# Patient Record
Sex: FEMALE | Race: WHITE | NOT HISPANIC OR LATINO | Employment: FULL TIME | URBAN - METROPOLITAN AREA
[De-identification: names, ages, dates, MRNs, and addresses within clinical notes are randomized per-mention and may not be internally consistent; named-entity substitution may affect disease eponyms.]

---

## 2023-10-29 ENCOUNTER — APPOINTMENT (OUTPATIENT)
Dept: RADIOLOGY | Facility: HOSPITAL | Age: 58
End: 2023-10-29
Payer: COMMERCIAL

## 2023-10-29 ENCOUNTER — HOSPITAL ENCOUNTER (EMERGENCY)
Facility: HOSPITAL | Age: 58
Discharge: HOME | End: 2023-10-29
Attending: EMERGENCY MEDICINE
Payer: COMMERCIAL

## 2023-10-29 VITALS
OXYGEN SATURATION: 100 % | WEIGHT: 140.87 LBS | RESPIRATION RATE: 16 BRPM | BODY MASS INDEX: 24.05 KG/M2 | HEART RATE: 77 BPM | HEIGHT: 64 IN | SYSTOLIC BLOOD PRESSURE: 146 MMHG | DIASTOLIC BLOOD PRESSURE: 67 MMHG | TEMPERATURE: 97.7 F

## 2023-10-29 DIAGNOSIS — S62.525A CLOSED NONDISPLACED FRACTURE OF DISTAL PHALANX OF LEFT THUMB, INITIAL ENCOUNTER: Primary | ICD-10-CM

## 2023-10-29 PROCEDURE — 99283 EMERGENCY DEPT VISIT LOW MDM: CPT | Mod: 25 | Performed by: EMERGENCY MEDICINE

## 2023-10-29 PROCEDURE — 94760 N-INVAS EAR/PLS OXIMETRY 1: CPT

## 2023-10-29 PROCEDURE — 2500000001 HC RX 250 WO HCPCS SELF ADMINISTERED DRUGS (ALT 637 FOR MEDICARE OP): Performed by: EMERGENCY MEDICINE

## 2023-10-29 PROCEDURE — 73130 X-RAY EXAM OF HAND: CPT | Mod: LT

## 2023-10-29 PROCEDURE — 29125 APPL SHORT ARM SPLINT STATIC: CPT | Mod: LT

## 2023-10-29 RX ORDER — IBUPROFEN 600 MG/1
600 TABLET ORAL ONCE
Status: COMPLETED | OUTPATIENT
Start: 2023-10-29 | End: 2023-10-29

## 2023-10-29 RX ADMIN — IBUPROFEN 600 MG: 600 TABLET, FILM COATED ORAL at 14:08

## 2023-10-29 ASSESSMENT — PAIN SCALES - GENERAL
PAINLEVEL_OUTOF10: 3
PAINLEVEL_OUTOF10: 1
PAINLEVEL_OUTOF10: 4

## 2023-10-29 ASSESSMENT — PAIN DESCRIPTION - FREQUENCY: FREQUENCY: CONSTANT/CONTINUOUS

## 2023-10-29 ASSESSMENT — PAIN - FUNCTIONAL ASSESSMENT
PAIN_FUNCTIONAL_ASSESSMENT: 0-10
PAIN_FUNCTIONAL_ASSESSMENT: 0-10

## 2023-10-29 ASSESSMENT — PAIN DESCRIPTION - ORIENTATION: ORIENTATION: LEFT

## 2023-10-29 ASSESSMENT — PAIN DESCRIPTION - LOCATION: LOCATION: FINGER (COMMENT WHICH ONE)

## 2023-10-29 ASSESSMENT — PAIN DESCRIPTION - DESCRIPTORS: DESCRIPTORS: ACHING

## 2023-10-29 NOTE — DISCHARGE INSTRUCTIONS
Keep splint on at all times.  Keep splint dry.    Rest, ice and elevation.    Ibuprofen over-the-counter.

## 2023-10-29 NOTE — ED PROVIDER NOTES
HPI   Chief Complaint   Patient presents with    Hand Injury     Patient fell yesterday at 2200, mechanical fall patient tripped over a case of water, NO LOC, NO head injury, NO blood thinners, patient has a left thumb injury, pt has been putting ice on hand.       The patient is a 58-year-old female who presents for evaluation of a left thumb injury.  Last night at approximately 10 PM the patient tripped over a case of bottled water and fell forward on an outstretched left hand, injuring her left thumb.  She is complaining of pain and bruising and swelling to the left thumb.  Denies any pain elsewhere in the hand and denies any wrist or forearm pain.  She did not strike her head.  No neck or back pain.  No other injuries or concerns.                          No data recorded                Patient History   History reviewed. No pertinent past medical history.  History reviewed. No pertinent surgical history.  No family history on file.  Social History     Tobacco Use    Smoking status: Not on file    Smokeless tobacco: Not on file   Substance Use Topics    Alcohol use: Not on file    Drug use: Not on file       Physical Exam   ED Triage Vitals [10/29/23 1349]   Temp Heart Rate Resp BP   36.5 °C (97.7 °F) 79 16 156/78      SpO2 Temp Source Heart Rate Source Patient Position   96 % Oral Monitor Sitting      BP Location FiO2 (%)     Left arm --       Physical Exam  Constitutional:       General: She is not in acute distress.     Appearance: Normal appearance.   HENT:      Head: Normocephalic and atraumatic.      Mouth/Throat:      Mouth: Mucous membranes are moist.      Pharynx: Oropharynx is clear. No oropharyngeal exudate or posterior oropharyngeal erythema.   Eyes:      Extraocular Movements: Extraocular movements intact.      Conjunctiva/sclera: Conjunctivae normal.      Pupils: Pupils are equal, round, and reactive to light.   Cardiovascular:      Rate and Rhythm: Normal rate and regular rhythm.      Heart  sounds: No murmur heard.  Pulmonary:      Breath sounds: Normal breath sounds. No wheezing, rhonchi or rales.   Abdominal:      General: Abdomen is flat. There is no distension.      Palpations: Abdomen is soft.      Tenderness: There is no abdominal tenderness.   Musculoskeletal:      Cervical back: Neck supple.      Comments: Examination of the left hand reveals tenderness and bruising and swelling involving the left thumb mainly around the distal phalanx and IP joint.  There is no tenderness at the snuffbox or at the base of the first metacarpal.  There is no wrist tenderness.   Skin:     General: Skin is warm and dry.      Findings: No rash.   Neurological:      Mental Status: She is alert.   Psychiatric:         Mood and Affect: Mood normal.         Behavior: Behavior normal.         ED Course & MDM   Diagnoses as of 10/29/23 1552   Closed nondisplaced fracture of distal phalanx of left thumb, initial encounter       Medical Decision Making  The patient was given a dose of ibuprofen and she was sent for x-rays of the left hand.  X-rays were read by the radiologist and show no acute bony abnormality.    Upon my review of the images I suspect that there is a subtle nondisplaced fracture involving the base of the distal phalanx of the thumb.    My concerns were discussed with the patient regarding the x-rays.  She was placed in a well-padded thumb spica OCL splint by the ancillary staff.  I reexamined the patient afterwards and find her to be neurovascularly intact.  She will be discharged home with instructions to rest, ice and elevate.  Ibuprofen over-the-counter for pain.  She was given an orthopedic referral for follow-up in 1 to 3 days.        Procedure  Procedures     Allan Hardwick, DO  10/29/23 2152

## 2023-10-30 ENCOUNTER — OFFICE VISIT (OUTPATIENT)
Dept: ORTHOPEDIC SURGERY | Facility: CLINIC | Age: 58
End: 2023-10-30
Payer: COMMERCIAL

## 2023-10-30 VITALS — BODY MASS INDEX: 23.9 KG/M2 | HEIGHT: 64 IN | WEIGHT: 140 LBS

## 2023-10-30 DIAGNOSIS — S63.682A OTHER SPRAIN OF LEFT THUMB, INITIAL ENCOUNTER: ICD-10-CM

## 2023-10-30 DIAGNOSIS — M79.645 THUMB PAIN, LEFT: Primary | ICD-10-CM

## 2023-10-30 PROCEDURE — 99213 OFFICE O/P EST LOW 20 MIN: CPT | Performed by: PHYSICIAN ASSISTANT

## 2023-10-30 PROCEDURE — 99203 OFFICE O/P NEW LOW 30 MIN: CPT | Performed by: PHYSICIAN ASSISTANT

## 2023-10-30 PROCEDURE — 1036F TOBACCO NON-USER: CPT | Performed by: PHYSICIAN ASSISTANT

## 2023-10-30 ASSESSMENT — PATIENT HEALTH QUESTIONNAIRE - PHQ9
SUM OF ALL RESPONSES TO PHQ9 QUESTIONS 1 AND 2: 0
2. FEELING DOWN, DEPRESSED OR HOPELESS: NOT AT ALL
1. LITTLE INTEREST OR PLEASURE IN DOING THINGS: NOT AT ALL

## 2023-10-30 ASSESSMENT — ENCOUNTER SYMPTOMS
DEPRESSION: 0
LOSS OF SENSATION IN FEET: 0
OCCASIONAL FEELINGS OF UNSTEADINESS: 0

## 2023-10-30 ASSESSMENT — PAIN DESCRIPTION - DESCRIPTORS: DESCRIPTORS: ACHING

## 2023-10-30 ASSESSMENT — PAIN SCALES - GENERAL: PAINLEVEL_OUTOF10: 5 - MODERATE PAIN

## 2023-10-30 NOTE — PATIENT INSTRUCTIONS
Thank you for coming to see us today!     Continue to use tylenol for pain control.   Rest, ice and elevate and wear your over the counter wrist brace    Follow up in 4 weeks for re-evaluation

## 2023-10-30 NOTE — PROGRESS NOTES
Subjective    Patient ID: Zahida Valdes is a 58 y.o. female.    Chief Complaint: Pain of the Left Hand (Left thumb)     Last Surgery: No surgery found  Last Surgery Date: No surgery found    This 58-year-old woman is seen today with left thumb pain.  She states that she was in her usual state of health until she suffered a fall onto her left thumb.  She went to the emergency room where x-rays showed no evidence of acute fracture.  She was put in a splint and referred to orthopedics.  She presents today in a splint with thumb spica and states that her left thumb pain is worse with and aggravated by prolonged movement.  She presents today and request discussion of further treatment options.      Review of systems  Negative  Left thumb pain     Objective   GENERAL:          General Appearance:  This is a pleasant patient with appropriate affect, in no acute distress.   DERMATOLOGY:          Skin: skin at the neck, upper and lower back, and trunk is intact. There is no evidence of skin rash, skin breakdown or ulceration, or atrophic skin change.   EXTREMITIES:          Vascular:  Right, left hands are warm with good color and pulses. Right and left calf and thigh are nontender and nonswollen.   NEUROLOGICAL:          Orientation:  Patient is alert and oriented to person, place, time and situation. Right and left upper and lower extremity motor and sensory examinations are intact. Phalen's test is positive bilaterally..       MUSCULOSKELETAL: Neck: Nontender. No pain with range of motion.  Right hand: Nontender to palpitation with range of motion.  Left hand: The splint is removed.  There is bruising over the distal phalanx of the left thumb.  There is no pain with gentle passive range of motion of the left thumb.  There is mild crepitus at the base of the first metacarpal with range of motion.  Phalen's is negative.  Finkelstein's is negative.  Neurovascular is intact to light touch    Image Results:  XR hand left 3+  views  Narrative: Interpreted By:  Franklyn Rod,   STUDY:  XR HAND LEFT 3+ VIEWS  10/29/2023 2:25 pm      INDICATION:  Signs/Symptoms:Injury      COMPARISON:  08/04/2022      ACCESSION NUMBER(S):  [id number]      ORDERING CLINICIAN:  SAVANNA ALVARENGA      TECHNIQUE:  Three views of the Left hand were performed.      FINDINGS:  Bones: The bones appear intact.      Joints: Moderate degenerative changes at the 1st carpometacarpal  joint..      Soft tissues: Unremarkable.      Impression: No acute bony abnormality.      MACRO:  None.      Signed by: Franklyn Rod 10/29/2023 2:40 PM  Dictation workstation:   [id number]      Assessment/Plan   Encounter Diagnoses:  Thumb pain, left    Other sprain of left thumb, initial encounter    No orders of the defined types were placed in this encounter.  Options are discussed with the patient in detail. The patient is instructed to continue to wear the left hand brace for support. the patient is instructed regarding activity modification and risk for further injury with falling or trauma, ice, provider directed at home gentle strengthening and ROM exercises, and the appropriate use of Tylenol as needed for pain with its potential adverse reactions and side effects. The patient understands. Return in 4 weeks for re-xray or sooner as needed. Please note that this report has been produced using speech recognition software.  It may contain errors related to grammar, punctuation or spelling.  Electronically signed, but not reviewed.  No follow-ups on file.

## 2023-11-29 ENCOUNTER — OFFICE VISIT (OUTPATIENT)
Dept: ORTHOPEDIC SURGERY | Facility: CLINIC | Age: 58
End: 2023-11-29
Payer: COMMERCIAL

## 2023-11-29 ENCOUNTER — HOSPITAL ENCOUNTER (OUTPATIENT)
Dept: RADIOLOGY | Facility: CLINIC | Age: 58
Discharge: HOME | End: 2023-11-29
Payer: COMMERCIAL

## 2023-11-29 VITALS — BODY MASS INDEX: 24.03 KG/M2 | WEIGHT: 139.99 LBS

## 2023-11-29 DIAGNOSIS — S63.682A OTHER SPRAIN OF LEFT THUMB, INITIAL ENCOUNTER: ICD-10-CM

## 2023-11-29 DIAGNOSIS — M79.645 THUMB PAIN, LEFT: Primary | ICD-10-CM

## 2023-11-29 DIAGNOSIS — M79.642 HAND PAIN, LEFT: ICD-10-CM

## 2023-11-29 DIAGNOSIS — S62.639A FRACTURE OF DISTAL PHALANX OF FINGER OF LEFT HAND: ICD-10-CM

## 2023-11-29 PROCEDURE — 99213 OFFICE O/P EST LOW 20 MIN: CPT | Performed by: PHYSICIAN ASSISTANT

## 2023-11-29 PROCEDURE — 73130 X-RAY EXAM OF HAND: CPT | Mod: LT

## 2023-11-29 PROCEDURE — 1036F TOBACCO NON-USER: CPT | Performed by: PHYSICIAN ASSISTANT

## 2023-11-29 PROCEDURE — 73130 X-RAY EXAM OF HAND: CPT | Mod: LEFT SIDE | Performed by: ORTHOPAEDIC SURGERY

## 2023-11-29 RX ORDER — ESTRADIOL 0.1 MG/G
CREAM VAGINAL
COMMUNITY
Start: 2023-11-28

## 2023-11-29 RX ORDER — AMLODIPINE BESYLATE 5 MG/1
5 TABLET ORAL DAILY
COMMUNITY

## 2023-11-29 RX ORDER — ALBUTEROL SULFATE 90 UG/1
2 AEROSOL, METERED RESPIRATORY (INHALATION) EVERY 6 HOURS PRN
COMMUNITY
Start: 2018-11-16

## 2023-11-29 ASSESSMENT — PAIN SCALES - GENERAL: PAINLEVEL_OUTOF10: 4

## 2023-11-29 ASSESSMENT — PATIENT HEALTH QUESTIONNAIRE - PHQ9
1. LITTLE INTEREST OR PLEASURE IN DOING THINGS: NOT AT ALL
2. FEELING DOWN, DEPRESSED OR HOPELESS: NOT AT ALL
SUM OF ALL RESPONSES TO PHQ9 QUESTIONS 1 AND 2: 0

## 2023-11-29 ASSESSMENT — ENCOUNTER SYMPTOMS
DEPRESSION: 0
LOSS OF SENSATION IN FEET: 0
OCCASIONAL FEELINGS OF UNSTEADINESS: 0

## 2023-11-29 ASSESSMENT — PAIN DESCRIPTION - DESCRIPTORS: DESCRIPTORS: ACHING

## 2023-11-29 ASSESSMENT — PAIN - FUNCTIONAL ASSESSMENT: PAIN_FUNCTIONAL_ASSESSMENT: 0-10

## 2023-11-29 NOTE — PROGRESS NOTES
Subjective    Patient ID: Zahida Valdes is a 58 y.o. female.    Chief Complaint: Follow-up of the Left Hand (Left thumb)     Last Surgery: No surgery found  Last Surgery Date: No surgery found    This 58-year-old woman is seen today in follow up for left thumb pain.  She states that she was in her usual state of health until she suffered a fall onto her left thumb.  She went to the emergency room where x-rays showed no evidence of acute fracture.  She was put in a splint and referred to orthopedics. Dr. Herrera and I previously evaluated her and treated her for left thumb sprain with splinting. She presents today in a splint with thumb spica and states that her left thumb pain is worse with and aggravated by prolonged movement.  She still is having pain when doing her ADLs. She presents today and request discussion of further treatment options.      Review of systems  Negative  Left thumb pain     Objective   GENERAL:          General Appearance:  This is a pleasant patient with appropriate affect, in no acute distress.   DERMATOLOGY:          Skin: skin at the neck, upper and lower back, and trunk is intact. There is no evidence of skin rash, skin breakdown or ulceration, or atrophic skin change.   EXTREMITIES:          Vascular:  Right, left hands are warm with good color and pulses. Right and left calf and thigh are nontender and nonswollen.   NEUROLOGICAL:          Orientation:  Patient is alert and oriented to person, place, time and situation. Right and left upper and lower extremity motor and sensory examinations are intact.        MUSCULOSKELETAL: Neck: Nontender. No pain with range of motion.  Right hand: Nontender to palpitation with range of motion.  Left hand: The splint is removed.  There is no bruising over the distal phalanx of the left thumb. There is tenderness to palpation. Deep and superficial tendons are intact. The thumb is stable to valgus varus stressing. There is no pain with gentle passive  range of motion of the left thumb.  There is mild crepitus at the base of the first metacarpal with range of motion.  Phalen's is negative.  Finkelstein's is negative.  Neurovascular is intact to light touch    Image Results:  XR hand left 3+ views  Narrative: Interpreted By:  Franklyn Rod,   STUDY:  XR HAND LEFT 3+ VIEWS  10/29/2023 2:25 pm      INDICATION:  Signs/Symptoms:Injury      COMPARISON:  08/04/2022      ACCESSION NUMBER(S):  [id number]      ORDERING CLINICIAN:  SAVANNA ALVARENGA      TECHNIQUE:  Three views of the Left hand were performed.      FINDINGS:  Bones: The bones appear intact.      Joints: Moderate degenerative changes at the 1st carpometacarpal  joint..      Soft tissues: Unremarkable.      Impression: No acute bony abnormality.      MACRO:  None.      Signed by: Franklyn Rod 10/29/2023 2:40 PM  Dictation workstation:   [id number]    AP and lateral x-rays of the left hand done and read in office today show that there is a small avulsion fracture at the base of the distal phalanx. These x-rays are reviewed in office today.     Assessment/Plan   Encounter Diagnoses:  No diagnosis found.    No orders of the defined types were placed in this encounter.  Options are discussed with the patient in detail. The patient states that she continues to have left thumb pain, and we discussed x-rays of the left hand done and read in office today show a small avulsion fracture at the base of the left thumb. We discussed continuing with splinting. We discussed cortisone injection to the base of the first metacarpal and she wishes to avoid this today.  She is instructed to follow up with her hand surgeon. the patient is instructed regarding activity modification and risk for further injury with falling or trauma, ice, provider directed at home gentle strengthening and ROM exercises, and the appropriate use of Tylenol as needed for pain with its potential adverse reactions and side effects. The patient  understands. Return as needed. Please note that this report has been produced using speech recognition software.  It may contain errors related to grammar, punctuation or spelling.  Electronically signed, but not reviewed.  No follow-ups on file.

## 2023-11-29 NOTE — PATIENT INSTRUCTIONS
Thank you for coming to see us today!     Continue to use tylenol for pain control.   Rest, ice and elevate  Follow up with your hand specialist     Follow up as needed

## 2024-12-24 ENCOUNTER — OFFICE VISIT (OUTPATIENT)
Dept: URGENT CARE | Age: 59
End: 2024-12-24
Payer: COMMERCIAL

## 2024-12-24 VITALS
BODY MASS INDEX: 24.03 KG/M2 | RESPIRATION RATE: 16 BRPM | TEMPERATURE: 97.2 F | SYSTOLIC BLOOD PRESSURE: 135 MMHG | HEART RATE: 72 BPM | WEIGHT: 140 LBS | DIASTOLIC BLOOD PRESSURE: 84 MMHG | OXYGEN SATURATION: 95 %

## 2024-12-24 DIAGNOSIS — H11.32 SUBCONJUNCTIVAL HEMORRHAGE OF LEFT EYE: Primary | ICD-10-CM

## 2024-12-24 PROCEDURE — 99202 OFFICE O/P NEW SF 15 MIN: CPT | Performed by: NURSE PRACTITIONER

## 2024-12-24 ASSESSMENT — ENCOUNTER SYMPTOMS
EYE ITCHING: 0
PHOTOPHOBIA: 0
GASTROINTESTINAL NEGATIVE: 1
EYE DISCHARGE: 0
EYE REDNESS: 1
CONSTITUTIONAL NEGATIVE: 1
RESPIRATORY NEGATIVE: 1
EYE PAIN: 0